# Patient Record
(demographics unavailable — no encounter records)

---

## 2024-12-11 NOTE — ASSESSMENT
[FreeTextEntry1] : 63F with HLD, statin intolerance, breast ca s/p R sided radiation, ex smoker quit 2007, aortic atherosclerosis, retinal atheroma here to establish care.  HUGHES- CCS = 0, NL St echo 3/2023.  Will update echo and EST.  Dyslipidemia Statin intolerance  high HDL and mildly elevated LDL. Statin intolerance x2 statins. Given retinal artery atherosclerosis  will trial PCSK9 for secondary prevention.    TTE 2/23 at [facility] - nl ef 66%, valve.  3/23: Stress echo no ischemia  Dietary and exercise habits reviewed and discussed with over 50% of the 60 minute visit spent discussing cardiovascular disease, the optimization of risk factors and lifestyle strategies that can be used to achieve this. Encouraged patient to continue healthy exercise and eating habits, focusing on a Mediterranean style of eating w/ more plant based foods in general, and aiming for the recommended 150 minutes per week of moderate physical activity.

## 2024-12-11 NOTE — HISTORY OF PRESENT ILLNESS
[FreeTextEntry1] : 63F with HLD, statin intolerance , breast ca s/p R sided radiation, ex smoker quit 2007, aortic atherosclerosis, retinal atheroma here to establish care.   Tried rosuvastain, pravastatin - significant fatigue with rosuvastatin resolve within 48 hours of d/c. Pravastatin weak and tired resolved w/ d/c.  Patient denies any chest pain, SOB, palpitations, orthopnea, PND or LE edema.  12/11/24 Chronic HUGHES, fatigue. Not new. She cycles, if she stops and gets off the bicycle and HR drops she gets dizzy.  She feels like her aerobic capacity is poor.  Worse in cold weather. CCS = 0.  Nl St echo 2023 with Dr Daniel.  s/p Covid Sept 2024, Breast CA dz 1/2021. s/p 2 surgeries, RT to right breast. A1c 5.7, , , TG 59, LDL 90   Fam: No premature ASCVD in first degree relative, no known sudden cardiac death Social ex smoker quit 2007 1ppd 30 years.